# Patient Record
Sex: FEMALE | Race: WHITE | NOT HISPANIC OR LATINO | URBAN - METROPOLITAN AREA
[De-identification: names, ages, dates, MRNs, and addresses within clinical notes are randomized per-mention and may not be internally consistent; named-entity substitution may affect disease eponyms.]

---

## 2017-10-03 ENCOUNTER — APPOINTMENT (OUTPATIENT)
Dept: WOMENS IMAGING | Facility: HOSPITAL | Age: 57
End: 2017-10-03

## 2017-10-03 PROCEDURE — 77063 BREAST TOMOSYNTHESIS BI: CPT | Performed by: RADIOLOGY

## 2017-10-03 PROCEDURE — 77067 SCR MAMMO BI INCL CAD: CPT | Performed by: RADIOLOGY

## 2019-01-22 ENCOUNTER — APPOINTMENT (OUTPATIENT)
Dept: WOMENS IMAGING | Facility: HOSPITAL | Age: 59
End: 2019-01-22

## 2019-01-22 PROCEDURE — 77067 SCR MAMMO BI INCL CAD: CPT | Performed by: RADIOLOGY

## 2019-01-22 PROCEDURE — 77063 BREAST TOMOSYNTHESIS BI: CPT | Performed by: RADIOLOGY

## 2020-08-19 ENCOUNTER — APPOINTMENT (OUTPATIENT)
Dept: WOMENS IMAGING | Facility: HOSPITAL | Age: 60
End: 2020-08-19

## 2020-08-19 PROCEDURE — 77063 BREAST TOMOSYNTHESIS BI: CPT | Performed by: RADIOLOGY

## 2020-08-19 PROCEDURE — 77067 SCR MAMMO BI INCL CAD: CPT | Performed by: RADIOLOGY

## 2020-10-26 ENCOUNTER — LAB REQUISITION (OUTPATIENT)
Dept: LAB | Facility: OTHER | Age: 60
End: 2020-10-26

## 2020-10-26 DIAGNOSIS — Z01.84 IMMUNITY STATUS TESTING: ICD-10-CM

## 2020-10-26 PROCEDURE — 86481 TB AG RESPONSE T-CELL SUSP: CPT | Performed by: EMERGENCY MEDICINE

## 2020-10-28 LAB
TSPOT INTERPRETATION: NEGATIVE
TSPOT NIL CONTROL INTERPRETATION: NORMAL
TSPOT PANEL A: 0
TSPOT PANEL B: 0
TSPOT POS CONTROL INTERPRETATION: NORMAL

## 2021-10-13 ENCOUNTER — APPOINTMENT (OUTPATIENT)
Dept: WOMENS IMAGING | Facility: HOSPITAL | Age: 61
End: 2021-10-13

## 2021-10-13 PROCEDURE — 77063 BREAST TOMOSYNTHESIS BI: CPT | Performed by: RADIOLOGY

## 2021-10-13 PROCEDURE — 77067 SCR MAMMO BI INCL CAD: CPT | Performed by: RADIOLOGY

## 2022-03-17 ENCOUNTER — TREATMENT (OUTPATIENT)
Dept: PHYSICAL THERAPY | Facility: CLINIC | Age: 62
End: 2022-03-17

## 2022-03-17 DIAGNOSIS — M25.559 PAIN IN HIP: ICD-10-CM

## 2022-03-17 DIAGNOSIS — M54.42 ACUTE BILATERAL LOW BACK PAIN WITH LEFT-SIDED SCIATICA: Primary | ICD-10-CM

## 2022-03-17 PROCEDURE — 97110 THERAPEUTIC EXERCISES: CPT | Performed by: PHYSICAL THERAPIST

## 2022-03-17 PROCEDURE — 97140 MANUAL THERAPY 1/> REGIONS: CPT | Performed by: PHYSICAL THERAPIST

## 2022-03-17 PROCEDURE — 97161 PT EVAL LOW COMPLEX 20 MIN: CPT | Performed by: PHYSICAL THERAPIST

## 2022-03-18 NOTE — PROGRESS NOTES
Physical Therapy Initial Evaluation and Plan of Care      Patient: Chey Cooper   : 1960  Diagnosis/ICD-10 Code:  Acute bilateral low back pain with left-sided sciatica [M54.42]  Referring practitioner: Kaitlin Lemus MD  Date of Initial Visit: 3/17/2022  Today's Date: 3/18/2022  Patient seen for 1 session       Visit Diagnoses:    ICD-10-CM ICD-9-CM   1. Acute bilateral low back pain with left-sided sciatica  M54.42 724.2     724.3   2. Pain in hip  M25.559 719.45         Subjective  Chief Complaint/Subjective Report: Patient presented to the clinic today with complaints of low back pain and sciatic symptoms that started after some workout classes around 1 week ago. Pt had PMH of LBP, has had a cortisone injection 5 days ago and now presetns to PT. Patient reported no significant medical history today aside from that previously mentioned; no reports of CNS signs or symptoms, or indications of other sinister pathologies were given in the subjective history today.  Mechanism of Injury: Unknown  Functional Limitations: ADLs, work-related activities  Subjective Goals for PT: Return to PLOF, decreased pain with ADLs and community activities  Prior Treatment for Current Condition: MD  Imaging: NA  Pain/VNRS (0-10/10): Worst: 8/10; Average: 4/10  Agg. Factors: Sitting, bending and lifting  Relieving Factors: Lying down  Subjective Questionnaire: LEFS: 45  PLOF: Independent with all functional tasks, ADLs, and community activities  Occupation:   Social:   PMH: See history section of patient chart  Precautions/Contraindications: No reported contraindications from subjective history today unless otherwise stated above.      Objective  AROM (% of Full --- * denotes degrees in place of % --- + denotes tested to be WFL)        -- Thoracic Rotation Right:  - Left:  -    -- Side Bending Right  75 Left 75    -- Lumbar Flexion:   75 c pain      -- Lumbar Extension:  70                    LE MMT (0-5/5 --- + denotes  WFL)  -- Hip Flexion Right:  -/5 Left: -/5  -- Hip Extension Right: -/5 Left: -/5  -- Hip Abduction Right: 4/5 Left: 4/5  -- Knee Flexion Right:  4+/5 Left: 4+/5  -- Knee Extension Right: 4+/5 Left: 5-/5  -- Ankle PF Right   -/5 Left: -/5  -- Ankle DF Right  -/5 Left: -/5      Functional Assessment: Impaired tolerance to bending and lifting activities  +Slump Test on R  + Response to lumbar joint mobs     See Exercise, Manual, and Modality Logs for complete treatment.       Documentation of Assessment Details: Patient presented the clinic with signs and symptoms consistent with lumbar radiating referred pain with radicular irritation. Patient demonstrated limitations and impairments in functional mobility and strength during today's evaluation, and will benefit from skilled PT address current limitations and impairments to help patient regain functional mobility and strength necessary to return to PLOF, reduce pain, and improve current symptoms as patient progresses towards meeting current goals established at therapy today. The patient present with no comorbidities or personal factors that impact my POC and deficit in above mentioned areas. Based on these findings and results from valid tests and measures, I am classifying this patient's presentation as stable with uncomplicated characteristics, and a good prognosis for recovery.     Assessment & Plan     Assessment  Impairments: abnormal gait, abnormal or restricted ROM, activity intolerance, impaired physical strength, lacks appropriate home exercise program and pain with function  Functional Limitations: carrying objects, lifting, sleeping, walking, uncomfortable because of pain, sitting and standingPrognosis details: Based on valid tests and measures performed today I am classifying this patient as presently stable with uncomplicated characteristics and good prognosis for recovery    Goals  Plan Goals: Pt will improve Subjective assessment by >75% within 6  weeks to demonstrate improvements in test and measure outcomes and overall functionality, and to show reduction of symptoms, improved activity tolerance, and ability to complete ADLs and work-related activities     Pt will improve functional mobility and pain free ROM to ranges that allow for pain free functional activities such as dressing, bathing, and completing ADLs within 8 weeks to demonstrate improvements in functional independence, mobility, and community participation to allow for a return to PLOF.    Pt will demonstrate 80% full ROM for all measured ROMs within 8 weeks to demonstrate improvements in functional mobility and ability to complete ADLs and work-related activities Independently.    Pt will sleep through the night without waking d/t current symptoms >5/7 nights per week within 8 weeks to demonstrate improvements restful sleep, overall function, and symptom reduction    Pt will report pain <2/10 at worst with activity and at rest within 8 weeks to demonstrate improvements in pain-free ROM and function to improve functional mobility, activities tolerance, and ability to complete ADLs and work-related activities    Pt will be able to lift and carry objects >30lbs without worsening of symptoms within 8 weeks to demonstrate improvements in functional mobility for ease of home and community tasks and improved functional independence.    Pt will be able to ambulate >30 mins independently without worsening of symptoms within 8 weeks to demonstrate improvements in functional mobility for ease of home and community ambulation and improved functional independence        Plan  Planned modality interventions: dry needling, TENS, high voltage pulsed current (pain management), electrical stimulation/Russian stimulation and cryotherapy  Planned therapy interventions: ADL retraining, abdominal trunk stabilization, manual therapy, neuromuscular re-education, balance/weight-bearing training, body mechanics  training, soft tissue mobilization, spinal/joint mobilization, joint mobilization, home exercise program, gait training, functional ROM exercises, strengthening, therapeutic activities and transfer training  Plan details: Duration: 2-3x/Wk for 4 Weeks - Upon completion of 4 weeks further evaluation and assessment with determine ongoing plan for continued care.    Continue with skilled physical therapy addressing previously mentioned limitations and impairments; progress HEP as tolerated; progress functional strengthening interventions to tolerance.        History # of Personal Factors and/or Comorbidities: LOW (0)  Examination of Body System(s): # of elements: LOW (1-2)  Clinical Presentation: STABLE   Clinical Decision Making: LOW       Timed:         Manual Therapy:    10     mins  08434;     Therapeutic Exercise:    15     mins  71603;     Neuromuscular Eva:    5    mins  62705;    Therapeutic Activity:     -     mins  09769;     Gait Training:           mins  15100;     Ultrasound:          mins  03012;    Ionto                                  mins   61114  Self Care                           mins   49919  Canalith Repos         mins 44687    Un-Timed:  Electrical Stimulation:          mins  16213 (MC );  Dry Needling         mins self-pay  Traction         mins 10509  Low Eval    20     Mins  43660  Mod Eval         Mins  49678  High Eval                            Mins  51680    Timed Treatment:   30   mins   Total Treatment:     50   mins      PT: Lam Honeycutt PT     License Number: [id number]  Electronically signed by Lam Honeycutt PT, 03/18/22, 11:14 AM EDT    Certification Period: 3/18/2022 thru 6/15/2022  I certify that the therapy services are furnished while this patient is under my care.  The services outlined above are required by this patient, and will be reviewed every 90 days.         Physician Signature:__________________________________________________    PHYSICIAN: Kaitlin Lemus  MD  NPI: [id number]                                      DATE:      Please sign and return via fax to [email or web address] . Thank you, [facility].

## 2022-03-21 ENCOUNTER — TREATMENT (OUTPATIENT)
Dept: PHYSICAL THERAPY | Facility: CLINIC | Age: 62
End: 2022-03-21

## 2022-03-21 DIAGNOSIS — M54.42 ACUTE BILATERAL LOW BACK PAIN WITH LEFT-SIDED SCIATICA: Primary | ICD-10-CM

## 2022-03-21 DIAGNOSIS — M25.559 PAIN IN HIP: ICD-10-CM

## 2022-03-21 PROCEDURE — 97110 THERAPEUTIC EXERCISES: CPT | Performed by: PHYSICAL THERAPIST

## 2022-03-21 PROCEDURE — 97140 MANUAL THERAPY 1/> REGIONS: CPT | Performed by: PHYSICAL THERAPIST

## 2022-03-21 PROCEDURE — 97530 THERAPEUTIC ACTIVITIES: CPT | Performed by: PHYSICAL THERAPIST

## 2022-03-21 NOTE — PROGRESS NOTES
Physical Therapy Daily Progress Note    Patient: Chey Cooper   : 1960  Diagnosis/ICD-10 Code:  Acute bilateral low back pain with left-sided sciatica [M54.42]  Referring practitioner: Kaitlin Lemus MD  Date of Initial Visit: Type: THERAPY  Noted: 3/17/2022  Today's Date: 3/21/2022  Patient seen for 2 sessions         Chey Cooper reports: had a good weekend, had one little setback with a trip to the ER for allergic reaction but otherwise feeling good. Having some numbness and pain today down L LE but I think it is from sitting at desk so much.     Subjective     Objective   See Exercise, Manual, and Modality Logs for complete treatment.       Assessment/Plan  Subjectively, pt reports no increase of pain or discomfort with interventions performed today. Performed well with core and hip stability and strengthening interventions today with ability to fatigue associated musculature. Continues to demonstrate good tolerance to exercise progressions. LAD did not relieve symptoms today. Continues to benefit from verbal/tactile cues to ensure proper form and technique for exercise performance.     Progress per Plan of Care           Manual Therapy:    8     mins  54659;  Therapeutic Exercise:    22     mins  51141;     Neuromuscular Eva:        mins  44587;    Therapeutic Activity:     12     mins  99049;     Gait Training:           mins  84017;     Ultrasound:          mins  87834;    Electrical Stimulation:         mins  59620 ( );  Dry Needling          mins self-pay    Timed Treatment:   42   mins   Total Treatment:     42   mins    Stella Ball PTA  Physical Therapist Assistant [phone]

## 2022-03-24 ENCOUNTER — TREATMENT (OUTPATIENT)
Dept: PHYSICAL THERAPY | Facility: CLINIC | Age: 62
End: 2022-03-24

## 2022-03-24 DIAGNOSIS — M54.42 ACUTE BILATERAL LOW BACK PAIN WITH LEFT-SIDED SCIATICA: Primary | ICD-10-CM

## 2022-03-24 DIAGNOSIS — M25.559 PAIN IN HIP: ICD-10-CM

## 2022-03-24 PROCEDURE — 97530 THERAPEUTIC ACTIVITIES: CPT | Performed by: PHYSICAL THERAPIST

## 2022-03-24 PROCEDURE — 97110 THERAPEUTIC EXERCISES: CPT | Performed by: PHYSICAL THERAPIST

## 2022-03-24 PROCEDURE — 97112 NEUROMUSCULAR REEDUCATION: CPT | Performed by: PHYSICAL THERAPIST

## 2022-03-24 NOTE — PROGRESS NOTES
Physical Therapy Daily Progress Note    Patient: Chey Cooper   : 1960  Diagnosis/ICD-10 Code:  Acute bilateral low back pain with left-sided sciatica [M54.42]  Referring practitioner: No ref. provider found  Date of Initial Visit: Type: THERAPY  Noted: 3/17/2022  Today's Date: 3/24/2022  Patient seen for 3 sessions         Chey Cooper reports: a little sore after last time but not bad. Not having any symptoms today.    Subjective     Objective   See Exercise, Manual, and Modality Logs for complete treatment.       Assessment/Plan  Subjectively, pt reports no increase of pain or discomfort with interventions performed today. Performed well with increased/progressed functional strengthening interventions. Continues to demonstrate excellent tolerance to exercise progressions. Demonstrates bilateral knee valgus with CKC strengthening. Continues to benefit from verbal/tactile cues to ensure proper form and technique for exercise performance.     Progress per Plan of Care           Manual Therapy:         mins  54203;  Therapeutic Exercise:    20     mins  70456;     Neuromuscular Eva:    10    mins  89294;    Therapeutic Activity:     10     mins  79652;     Gait Training:           mins  38995;     Ultrasound:          mins  66581;    Electrical Stimulation:         mins  84006 ( );  Dry Needling          mins self-pay    Timed Treatment:   40   mins   Total Treatment:     40   mins    Stella Ball PTA  Physical Therapist Assistant [phone]